# Patient Record
Sex: MALE | Race: BLACK OR AFRICAN AMERICAN
[De-identification: names, ages, dates, MRNs, and addresses within clinical notes are randomized per-mention and may not be internally consistent; named-entity substitution may affect disease eponyms.]

---

## 2021-12-27 ENCOUNTER — HOSPITAL ENCOUNTER (EMERGENCY)
Dept: HOSPITAL 63 - ER | Age: 31
Discharge: HOME | End: 2021-12-27
Payer: COMMERCIAL

## 2021-12-27 VITALS — HEIGHT: 66 IN | BODY MASS INDEX: 24.13 KG/M2 | WEIGHT: 150.13 LBS

## 2021-12-27 VITALS — DIASTOLIC BLOOD PRESSURE: 72 MMHG | SYSTOLIC BLOOD PRESSURE: 117 MMHG

## 2021-12-27 DIAGNOSIS — B07.8: Primary | ICD-10-CM

## 2021-12-27 LAB
APTT PPP: YELLOW S
BACTERIA #/AREA URNS HPF: 0 /HPF
BILIRUB UR QL STRIP: (no result)
FIBRINOGEN PPP-MCNC: CLEAR MG/DL
GLUCOSE UR STRIP-MCNC: (no result) MG/DL
NITRITE UR QL STRIP: (no result)
RBC #/AREA URNS HPF: 0 /HPF (ref 0–2)
SP GR UR STRIP: 1.02
UROBILINOGEN UR-MCNC: 0.2 MG/DL
WBC #/AREA URNS HPF: 0 /HPF (ref 0–4)

## 2021-12-27 PROCEDURE — 99283 EMERGENCY DEPT VISIT LOW MDM: CPT

## 2021-12-27 PROCEDURE — 81001 URINALYSIS AUTO W/SCOPE: CPT

## 2021-12-27 PROCEDURE — 36415 COLL VENOUS BLD VENIPUNCTURE: CPT

## 2021-12-27 PROCEDURE — 87591 N.GONORRHOEAE DNA AMP PROB: CPT

## 2021-12-27 PROCEDURE — 87491 CHLMYD TRACH DNA AMP PROBE: CPT

## 2021-12-27 NOTE — PHYS DOC
Past History


Past Surgical History:  No Surgical History





Adult General


Chief Complaint


Chief Complaint:  PENIS PROBLEM





HPI


HPI





Patient is a 31-year-old male presents emergency department complaining of bumps

on his penis that he noticed this morning when he woke up.  Patient denies these

bumps being itchy or painful.  Denies penile discharge, denies other sores on 

his penis or genitals.  Patient denies nausea, vomiting, diarrhea, denies rashes

to the soles of his feet or the palms of his hand, denies other rashes of his sk

in, denies fever or chills.  Denies other physical complaints or physical 

concerns.  Patient reports monogamous sexual relationship unprotected sex.  

Denies history of cigarette smoking, drinks occasionally, denies illicit drug 

use, denies history of IV drug use.





Review of Systems


Review of Systems





14 body systems of review of systems have been reviewed.  See HPI for pertinent 

positives and negative responses, otherwise all other systems are negative, 

nonpertinent or noncontributory.


Constitutional: Negative except as outlined in HPI above.


Skin: Negative except as outlined in HPI above.


Eyes:   Negative except as outlined in HPI above.


HENT: Negative except as outlined in HPI above.


Respiratory:   Negative except as outlined in HPI above.


Cardiovascular:   Negative except as outlined in HPI above.


GI:   Negative except as outlined in HPI above.


:  Negative except as outlined in HPI above.


Musculoskeletal:   Negative except as outlined in HPI above.


Integument:   Negative except as outlined in HPI above.


Neurologic:   Negative except as outlined in HPI above.


Endocrine:   Negative except as outlined in HPI above.


Lymphatic:  Negative except as outlined in HPI above.


Psychiatric:  Negative except as outlined in HPI above.





Allergies


Allergies





Allergies








Coded Allergies Type Severity Reaction Last Updated Verified


 


  No Known Drug Allergies    12/27/21 No











Physical Exam


Physical Exam


Constitutional: Well developed, well nourished, no acute distress, non-toxic 

appearance.  31-year-old male in no apparent distress.


HENT: Normocephalic, atraumatic.


Eyes: Conjunctiva normal, no discharge.


Neck: Normal range of motion.


Cardiovascular: Distal cap refill less than 2 seconds, no cyanosis appreciated.


Lungs & Thorax: Patient is in no respiratory distress, no adventitious lung 

sounds appreciated.


Abdomen: Bowel sounds normal, soft, no tenderness, no masses, no pulsatile 

masses.  No bruising or skin discoloration of the abdomen.


Skin: Warm, dry, no erythema, no rash.  


Back: No tenderness, no CVA tenderness.  


Extremities: No tenderness, no cyanosis, no clubbing, ROM intact, no edema.  


Neurologic: Alert and oriented X 3, normal motor function, normal sensory 

function, no focal deficits noted. 


Psychologic: Affect normal, judgement normal, mood normal. 


: Exam of penis and genitals, the patient is circumcised, there are four 1 mm 

in diameter sessile wart lesions to the left lateral shaft of penis, these 

lesions are not open, they are not draining.  Nonerythematous, consistent skin 

color with patient.  No other lesions or warts or rashes appreciated of the 

genitals.





Current Patient Data


Vital Signs





                                   Vital Signs








  Date Time  Temp Pulse Resp B/P (MAP) Pulse Ox O2 Delivery O2 Flow Rate FiO2


 


12/27/21 14:18 98.0 82 16 117/72 (87) 99 Room Air  








Lab Results





                                Laboratory Tests








Test


 12/27/21


14:30


 


Urine Collection Type Clean catch  


 


Urine Color Yellow  


 


Urine Clarity Clear  


 


Urine pH 8.5  


 


Urine Specific Gravity 1.020  


 


Urine Protein


 Neg


(NEG-TRACE)


 


Urine Glucose (UA)


 Neg mg/dL


(NEG)


 


Urine Ketones (Stick)


 Neg mg/dL


(NEG)


 


Urine Blood Neg (NEG)  


 


Urine Nitrite Neg (NEG)  


 


Urine Bilirubin Neg (NEG)  


 


Urine Urobilinogen Dipstick


 0.2 mg/dL (0.2


mg/dL)


 


Urine Leukocyte Esterase Neg (NEG)  


 


Urine RBC 0 /HPF (0-2)  


 


Urine WBC 0 /HPF (0-4)  


 


Urine Bacteria


 0 /HPF (0-FEW)














EKG


EKG


[]





Radiology/Procedures


Radiology/Procedures


[]





Heart Score


C/O Chest Pain:  No


Risk Factors:


Risk Factors:  DM, Current or recent (<one month) smoker, HTN, HLP, family 

history of CAD, obesity.


Risk Scores:


Risk Factors:  DM, Current or recent (<one month) smoker, HTN, HLP, family 

history of CAD, obesity.





Course & Med Decision Making


Course & Med Decision Making


Pertinent Labs and Imaging studies reviewed. (See chart for details)





31-year-old male, vital signs reviewed, presents to the emergency department 

concerning bumps on his penis.  Physical examination consistent with genital 

warts.  Discussed findings with patient, recommended follow-up with dermatology 

for management and removal of warts.  Discussed with patient safe sex practices,

 barrier protection sex, return to ER precautions or concerns, patient gave 

verbal understanding of and is amenable to ED discharge planning.





Genital warts are sessile in nature, there is no complaint of pain or pruritus, 

this is unlikely a squamous cell carcinomas, skin tag, molluscum contagiosum, 

condyloma Lotta, angiofibroma, or HSV 1/2.





Dragon Disclaimer


Dragon Disclaimer


This electronic medical record was generated, in whole or in part, using a voice

 recognition dictation system.





Departure


Departure:


Impression:  


   Primary Impression:  


   Genital warts


Disposition:  01 HOME / SELF CARE / HOMELESS


Condition:  GOOD


Referrals:  


PCP,NO (PCP)


Patient Instructions:  Genital Warts





Additional Instructions:  


You are seen today in the emergency department for bumps on your penis.  These 

appear to be genital warts.  As we discussed, please use condoms barrier sex 

during sexual activity to help prevent the spread of sexually transmitted dis

eases.  As we discussed, please follow-up with a dermatologist or a surgeon to 

have these evaluated for removal.  You may consider using the [facility] dermatology of

 [facility] located at [address] [place] [address], their telephone number 

is area code [phone], call tomorrow for an appointment to be seen soon.  

Return to the emergency department for worsening symptoms or other concerns.  

Thank you for visiting our Emergency Department.  It was a pleasure taking care 

of you today in the emergency department and we appreciate you trusting us with 

your care. If any additional problems come up don't hesitate to return to visit 

us. Please follow up with your primary care provider so they can plan additional

 care if needed and know about the problem that you had. If symptoms worsen come

 back to the Emergency Department. Any concerning symptoms that start such as 

chest pain, shortness of air, weakness or numbness on one side of the body, 

running high fevers or any other concerning symptoms return to the ER.











LAYTON MAYA       Dec 27, 2021 15:36